# Patient Record
(demographics unavailable — no encounter records)

---

## 2025-01-06 NOTE — HISTORY OF PRESENT ILLNESS
[de-identified] : follow up for right shoulder arthritis. Still having pain. Recently diagnosed with breast cancer so needed to cancel surgery for now

## 2025-01-06 NOTE — ASSESSMENT
[FreeTextEntry1] : Given recent Hx of breast CA we will hold on surgery for now. We will give her a steroid shot to help w/ pain. She will also cont with PT. I will see her in 3-4 months. At some point she will benefit from a RTSA. We also discussed NSAID use as well as risks/benefits.   Procedure: After obtaining verbal consent and under sterile conditions 1.5cc of 0.25% marcaine and 1cc of 40mg Kenalog was injected into the right GH joint under ultrasound guidance. She tolerated this well

## 2025-01-06 NOTE — PHYSICAL EXAM
[de-identified] : well appearing female in NAD. ROM of left shoulder is full. Right shoulder shows limited ROM to 120 of FF and Abd, 20 of ER. IR to hip. Has some weakness on strength testing of cuff. NVI. No pain w/ ROM of neck or elbow [de-identified] : multiple views right shoulder were ordered and reviewed. These demonstrate arthritic changeas with deformity